# Patient Record
Sex: MALE | URBAN - METROPOLITAN AREA
[De-identification: names, ages, dates, MRNs, and addresses within clinical notes are randomized per-mention and may not be internally consistent; named-entity substitution may affect disease eponyms.]

---
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
## 2022-10-19 NOTE — PROCEDURE: REASSURANCE
(E4) spontaneous
Hide Additional Notes?: No
Detail Level: Simple
Additional Notes (Optional): Mild inflammation.  Will see how this responds to Absorica, call if it worsens for ILK.